# Patient Record
Sex: MALE | Race: BLACK OR AFRICAN AMERICAN | NOT HISPANIC OR LATINO | Employment: OTHER | URBAN - METROPOLITAN AREA
[De-identification: names, ages, dates, MRNs, and addresses within clinical notes are randomized per-mention and may not be internally consistent; named-entity substitution may affect disease eponyms.]

---

## 2022-12-11 VITALS
WEIGHT: 160 LBS | SYSTOLIC BLOOD PRESSURE: 133 MMHG | HEART RATE: 54 BPM | RESPIRATION RATE: 18 BRPM | DIASTOLIC BLOOD PRESSURE: 73 MMHG | OXYGEN SATURATION: 100 % | TEMPERATURE: 97.7 F

## 2022-12-11 PROCEDURE — 99283 EMERGENCY DEPT VISIT LOW MDM: CPT

## 2022-12-12 ENCOUNTER — APPOINTMENT (OUTPATIENT)
Dept: GENERAL RADIOLOGY | Facility: CLINIC | Age: 52
End: 2022-12-12
Attending: EMERGENCY MEDICINE
Payer: OTHER GOVERNMENT

## 2022-12-12 ENCOUNTER — HOSPITAL ENCOUNTER (EMERGENCY)
Facility: CLINIC | Age: 52
Discharge: HOME | End: 2022-12-12
Attending: EMERGENCY MEDICINE | Admitting: EMERGENCY MEDICINE
Payer: OTHER GOVERNMENT

## 2022-12-12 DIAGNOSIS — S93.609A SPRAIN OF FOOT, UNSPECIFIED LATERALITY, INITIAL ENCOUNTER: ICD-10-CM

## 2022-12-12 PROCEDURE — 73630 X-RAY EXAM OF FOOT: CPT | Mod: LT

## 2022-12-12 ASSESSMENT — ACTIVITIES OF DAILY LIVING (ADL): ADLS_ACUITY_SCORE: 33

## 2022-12-12 ASSESSMENT — ENCOUNTER SYMPTOMS: MYALGIAS: 1

## 2022-12-12 NOTE — ED PROVIDER NOTES
History   Chief Complaint:  Foot Injury      HPI   Theron Monterroso is a 52 year old male who presents for evaluation of a foot injury. Yesterday around 1700 the patient kicked a 10 pound medicine ball that he had mistook for a regular sports ball with his left foot and since then he has developed pain in the foot and has been unable to bear weight. Due to concern for this injury he came into the ED for evaluation. He denies any pain in his toes, ankle, or lower leg.     Review of Systems   Musculoskeletal: Positive for gait problem and myalgias (left foot).   All other systems reviewed and are negative.      Allergies:  No known drug allergies      Medications:  The patient is not currently taking any prescribed medications.      Past Medical History:     The patient presents to the ED alone.      Social History:  The patient presents to the ED alone.       Physical Exam     Patient Vitals for the past 24 hrs:   BP Temp Temp src Pulse Resp SpO2 Weight   12/11/22 2351 133/73 97.7  F (36.5  C) Temporal 54 18 100 % 72.6 kg (160 lb)       Physical Exam  Constitutional: Well-appearing.   Musculoskeletal: 2+ distal pulses. Tenderness to the dorsum of the left mid forefoot, no deformity. Non-weightbearing. Full range of motion.   Neurological: Left foot is neurovascularly intact.   Skin: No swelling or ecchymosis to left foot.      Emergency Department Course     Imaging:  Foot XR, G/E 3 views, left   Final Result   IMPRESSION: A small portion of the distal toes are incompletely imaged on the lateral projection.      Within this limitation, there is no radiographic evidence of acute fracture or malalignment. There is minimal osteoarthritic change. No radiopaque foreign body.      Report per radiology     Emergency Department Course:     Reviewed:  I reviewed nursing notes, vitals and past medical history    Assessments:  0305:  I obtained history and examined the patient as noted above.     0317: I updated and reassessed  the patient.       Disposition:  The patient was discharged to home.     Impression & Plan         Medical Decision Making:  Theron Monterroso is a 52 year old male presents for evaluation after kicking a medicine ball with his left foot.  Signs and symptoms are consistent with a sprain.  A broad differential was considered including sprain, strain, fracture, tendon rupture, nerve impingement/compromise, referred pain. Supportive outpatient management is indicated.  Rest, ice, and elevation treatment was discussed with the patient. The patients head to toe trauma exam is otherwise negative for serious underlying disease of the head, neck, chest, abdomen, extremities, pelvis.  Close follow-up with patient's primary care physician per discharge precautions. Contusion discharge instructions given for home.        Diagnosis:    ICD-10-CM    1. Sprain of foot, unspecified laterality, initial encounter  S93.609A             Scribe Disclosure:  I, Librado Smith, am serving as a scribe at 2:44 AM on 12/12/2022 to document services personally performed by Jude Castillo MD based on my observations and the provider's statements to me.           Jude Castillo MD  12/12/22 0434

## 2022-12-12 NOTE — ED TRIAGE NOTES
Presents to ED with c/o L foot pain. Patient stated he kicked a medicine ball this evening and since that time he has been unable to bare weight on the foot. CMS intact     Triage Assessment     Row Name 12/11/22 8780       Triage Assessment (Adult)    Airway WDL WDL       Respiratory WDL    Respiratory WDL WDL       Cardiac WDL    Cardiac WDL WDL

## 2023-08-13 ENCOUNTER — HEALTH MAINTENANCE LETTER (OUTPATIENT)
Age: 53
End: 2023-08-13

## 2024-10-06 ENCOUNTER — HEALTH MAINTENANCE LETTER (OUTPATIENT)
Age: 54
End: 2024-10-06